# Patient Record
Sex: FEMALE | Race: WHITE | NOT HISPANIC OR LATINO
[De-identification: names, ages, dates, MRNs, and addresses within clinical notes are randomized per-mention and may not be internally consistent; named-entity substitution may affect disease eponyms.]

---

## 2020-07-20 ENCOUNTER — APPOINTMENT (OUTPATIENT)
Dept: RHEUMATOLOGY | Facility: CLINIC | Age: 60
End: 2020-07-20
Payer: COMMERCIAL

## 2020-07-20 VITALS
DIASTOLIC BLOOD PRESSURE: 83 MMHG | OXYGEN SATURATION: 96 % | SYSTOLIC BLOOD PRESSURE: 152 MMHG | HEIGHT: 63 IN | BODY MASS INDEX: 40.75 KG/M2 | TEMPERATURE: 98.2 F | WEIGHT: 230 LBS | HEART RATE: 92 BPM | RESPIRATION RATE: 16 BRPM

## 2020-07-20 DIAGNOSIS — Z86.39 PERSONAL HISTORY OF OTHER ENDOCRINE, NUTRITIONAL AND METABOLIC DISEASE: ICD-10-CM

## 2020-07-20 DIAGNOSIS — Z86.79 PERSONAL HISTORY OF OTHER DISEASES OF THE CIRCULATORY SYSTEM: ICD-10-CM

## 2020-07-20 PROCEDURE — 99205 OFFICE O/P NEW HI 60 MIN: CPT | Mod: GC

## 2020-07-20 NOTE — CONSULT LETTER
[Please see my note below.] : Please see my note below. [Dear  ___] : Dear  [unfilled], [Consult Letter:] : I had the pleasure of evaluating your patient, [unfilled]. [Sincerely,] : Sincerely, [Consult Closing:] : Thank you very much for allowing me to participate in the care of this patient.  If you have any questions, please do not hesitate to contact me. [FreeTextEntry3] : Oracio Schwartz MD\par Rheumatology\par [facility]\par  of Medicine\par [facility] \par \par [address]\[place] [address]\par \par [address]\[place] [address]\par \par [address] [address]\par \par phone:  [phone]\par fax:      [phone]\par

## 2020-07-20 NOTE — ASSESSMENT
[FreeTextEntry1] : 60yo F with DM, HTN, OA of the hips and knees presented to the clinic for evaluation of knee pain. on exam with pain with active ROM and mild ttp over the anterior knee b/l.\par \par \par Impression: \par Osteoarthritis of the knees and hip\par Carpal tunnel syndrome\par \par \par Recommendations\par -Naproxen BID prn\par -Voltaren gel  TID prn\par -referral to physical therapy\par -Knee braces \par -weight loss counseling\par -follow up in 3 weeks of intraarticular injection (synvisc)\par -patient will follow up with neurology numbness and decreased sensation over the hands

## 2020-07-20 NOTE — PHYSICAL EXAM
[General Appearance - Alert] : alert [General Appearance - In No Acute Distress] : in no acute distress [Auscultation Breath Sounds / Voice Sounds] : lungs were clear to auscultation bilaterally [Heart Rate And Rhythm] : heart rate was normal and rhythm regular [Heart Sounds] : normal S1 and S2 [Oriented To Time, Place, And Person] : oriented to person, place, and time [Sclera] : the sclera and conjunctiva were normal [Outer Ear] : the ears and nose were normal in appearance [Neck Appearance] : the appearance of the neck was normal [Oropharynx] : the oropharynx was normal [Neck Cervical Mass (___cm)] : no neck mass was observed [Jugular Venous Distention Increased] : there was no jugular-venous distention [Thyroid Diffuse Enlargement] : the thyroid was not enlarged [Thyroid Nodule] : there were no palpable thyroid nodules [Heart Sounds Gallop] : no gallops [Heart Sounds Pericardial Friction Rub] : no pericardial rub [Murmurs] : no murmurs [Abdomen Soft] : soft [Bowel Sounds] : normal bowel sounds [Abdomen Tenderness] : non-tender [] : no hepato-splenomegaly [Cervical Lymph Nodes Enlarged Posterior Bilaterally] : posterior cervical [Abdomen Mass (___ Cm)] : no abdominal mass palpated [Cervical Lymph Nodes Enlarged Anterior Bilaterally] : anterior cervical [Supraclavicular Lymph Nodes Enlarged Bilaterally] : supraclavicular [No CVA Tenderness] : no ~M costovertebral angle tenderness [No Spinal Tenderness] : no spinal tenderness [Skin Color & Pigmentation] : normal skin color and pigmentation [No Focal Deficits] : no focal deficits [Skin Turgor] : normal skin turgor [Impaired Insight] : insight and judgment were intact [Affect] : the affect was normal [FreeTextEntry1] : knee symmetric, with +1 lower ext edema. pain with active ROM on flexion of b/l knee. mild tenderness to palpation over the joint line. visible varices present on lower ext. passive ROM with minimal discomfort and full ROM;  B/L hips w/ pain upon movement in all planes

## 2020-07-20 NOTE — HISTORY OF PRESENT ILLNESS
[Arthralgias] : arthralgias [Decreased ROM] : decreased range of motion [Difficulty Standing] : difficulty standing [Difficulty Walking] : difficulty walking [FreeTextEntry1] : 60yo F with PMHx history of  DM, HTN,  knee OA and hip OA presented to the clinic for evaluation of knee pain.\par \par She describes the pain as constant, on bilateral knee, severe-moderate intensity, sharp and not radiated. walking and standing precipitates the pain and reports bilateral leg swelling and occasional knee swelling. she was following with Ortho and their last recommendation was surgery. she had problems with her insurance and couldn't follow up with them. she was referred to rheumatology by PCP for evaluation of joint complains.\par \par \par ROS:\par hand numbness and decreased sensation. use a split overnight which improved symptoms\par difficulty walk and standing due to knee pain\par has had multiple knee and hip injection in the pain with mild relief of symptoms [Anorexia] : no anorexia [Malaise] : no malaise [Weight Loss] : no weight loss [Fever] : no fever [Fatigue] : no fatigue [Chills] : no chills [Skin Nodules] : no skin nodules [Malar Facial Rash] : no malar facial rash [Skin Lesions] : no lesions [Cough] : no cough [Dry Mouth] : no dry mouth [Oral Ulcers] : no oral ulcers [Dysphagia] : no dysphagia [Dysphonia] : no dysphonia [Shortness of Breath] : no shortness of breath [Joint Swelling] : no joint swelling [Joint Warmth] : no joint warmth [Chest Pain] : no chest pain [Falls] : no falls [Morning Stiffness] : no morning stiffness [Joint Deformity] : no joint deformity [Dyspnea] : no dyspnea [Myalgias] : no myalgias [Muscle Weakness] : no muscle weakness [Muscle Spasms] : no muscle spasms [Visual Changes] : no visual changes [Eye Pain] : no eye pain [Muscle Cramping] : no muscle cramping [Dry Eyes] : no dry eyes [Eye Redness] : no eye redness

## 2020-07-20 NOTE — END OF VISIT
[] : Fellow [FreeTextEntry3] : I performed a history and physical exam of the patient and discussed his management with the fellow. I reviewed the fellow’s note and agree with the documented findings and plan of care.  58 y/o F with OA of the B/L hips and knees.  Will refer for PT, continue NSAID's, warm compresses, topical analgesics, weight loss, knee braces.  Pt also w/ numbness of her hands - has some features of CTS, though involvement of 5th fingers and decreased sensation suggestive of other neurologic etiology.  Refer to neurology.

## 2020-08-10 ENCOUNTER — APPOINTMENT (OUTPATIENT)
Dept: RHEUMATOLOGY | Facility: CLINIC | Age: 60
End: 2020-08-10
Payer: COMMERCIAL

## 2020-08-10 VITALS
DIASTOLIC BLOOD PRESSURE: 75 MMHG | TEMPERATURE: 98 F | HEART RATE: 101 BPM | OXYGEN SATURATION: 95 % | WEIGHT: 230 LBS | HEIGHT: 63 IN | SYSTOLIC BLOOD PRESSURE: 122 MMHG | BODY MASS INDEX: 40.75 KG/M2

## 2020-08-10 DIAGNOSIS — Z56.0 UNEMPLOYMENT, UNSPECIFIED: ICD-10-CM

## 2020-08-10 DIAGNOSIS — Z82.69 FAMILY HISTORY OF OTHER DISEASES OF THE MUSCULOSKELETAL SYSTEM AND CONNECTIVE TISSUE: ICD-10-CM

## 2020-08-10 DIAGNOSIS — Z78.9 OTHER SPECIFIED HEALTH STATUS: ICD-10-CM

## 2020-08-10 DIAGNOSIS — Z87.891 PERSONAL HISTORY OF NICOTINE DEPENDENCE: ICD-10-CM

## 2020-08-10 PROCEDURE — 99214 OFFICE O/P EST MOD 30 MIN: CPT

## 2020-08-10 SDOH — ECONOMIC STABILITY - INCOME SECURITY: UNEMPLOYMENT, UNSPECIFIED: Z56.0

## 2020-08-10 NOTE — HISTORY OF PRESENT ILLNESS
[Arthralgias] : arthralgias [Decreased ROM] : decreased range of motion [Difficulty Walking] : difficulty walking [Difficulty Standing] : difficulty standing [FreeTextEntry1] : Feeling "the same" since last visit.  Still w/ pain in her B/L knees and hips, unchanged.  She has started going for PT and says her knees now feel "a little looser", though no change in the pain level.  No new complaints. [Malaise] : no malaise [Weight Loss] : no weight loss [Anorexia] : no anorexia [Fever] : no fever [Fatigue] : no fatigue [Chills] : no chills [Skin Lesions] : no lesions [Malar Facial Rash] : no malar facial rash [Skin Nodules] : no skin nodules [Oral Ulcers] : no oral ulcers [Dysphonia] : no dysphonia [Dry Mouth] : no dry mouth [Cough] : no cough [Shortness of Breath] : no shortness of breath [Dysphagia] : no dysphagia [Joint Warmth] : no joint warmth [Chest Pain] : no chest pain [Joint Swelling] : no joint swelling [Morning Stiffness] : no morning stiffness [Falls] : no falls [Joint Deformity] : no joint deformity [Dyspnea] : no dyspnea [Myalgias] : no myalgias [Muscle Weakness] : no muscle weakness [Muscle Spasms] : no muscle spasms [Muscle Cramping] : no muscle cramping [Visual Changes] : no visual changes [Eye Pain] : no eye pain [Dry Eyes] : no dry eyes [Eye Redness] : no eye redness

## 2020-08-10 NOTE — PHYSICAL EXAM
[General Appearance - Alert] : alert [Sclera] : the sclera and conjunctiva were normal [General Appearance - In No Acute Distress] : in no acute distress [Outer Ear] : the ears and nose were normal in appearance [Neck Appearance] : the appearance of the neck was normal [Oropharynx] : the oropharynx was normal [Jugular Venous Distention Increased] : there was no jugular-venous distention [Neck Cervical Mass (___cm)] : no neck mass was observed [Thyroid Diffuse Enlargement] : the thyroid was not enlarged [Thyroid Nodule] : there were no palpable thyroid nodules [Heart Rate And Rhythm] : heart rate was normal and rhythm regular [Heart Sounds] : normal S1 and S2 [Auscultation Breath Sounds / Voice Sounds] : lungs were clear to auscultation bilaterally [Heart Sounds Gallop] : no gallops [Heart Sounds Pericardial Friction Rub] : no pericardial rub [Murmurs] : no murmurs [Abdomen Soft] : soft [Abdomen Tenderness] : non-tender [Bowel Sounds] : normal bowel sounds [Abdomen Mass (___ Cm)] : no abdominal mass palpated [Supraclavicular Lymph Nodes Enlarged Bilaterally] : supraclavicular [Cervical Lymph Nodes Enlarged Posterior Bilaterally] : posterior cervical [Cervical Lymph Nodes Enlarged Anterior Bilaterally] : anterior cervical [No CVA Tenderness] : no ~M costovertebral angle tenderness [No Spinal Tenderness] : no spinal tenderness [Skin Turgor] : normal skin turgor [Skin Color & Pigmentation] : normal skin color and pigmentation [Oriented To Time, Place, And Person] : oriented to person, place, and time [No Focal Deficits] : no focal deficits [] : no rash [Affect] : the affect was normal [Impaired Insight] : insight and judgment were intact [FreeTextEntry1] : lower ext edema +1

## 2020-08-10 NOTE — ASSESSMENT
[FreeTextEntry1] : 60yo F with:\par 1) OA of the hips and knees:\par   - Reiterated importance of exercise\par   - Cont nabumetone 750mg BID prn +/- Tylenol prn\par   - wt loss\par   - warm compresses\par   - OTC topical analgesics\par   - knee braces\par   - Will plan to perform Durolane injections to B/L knees\par   - Awaiting results of prior bloodwork and x-rays.\par 2)  Numbness in B/L hands:  pt w/ some features of CTS, though involvement of 5th fingers and decreased sensation suggestive of other neurologic etiology\par   - Awaiting neurology eval.

## 2020-09-14 ENCOUNTER — RX RENEWAL (OUTPATIENT)
Age: 60
End: 2020-09-14

## 2020-09-14 ENCOUNTER — APPOINTMENT (OUTPATIENT)
Dept: RHEUMATOLOGY | Facility: CLINIC | Age: 60
End: 2020-09-14
Payer: COMMERCIAL

## 2020-09-14 VITALS
RESPIRATION RATE: 16 BRPM | OXYGEN SATURATION: 94 % | TEMPERATURE: 98 F | SYSTOLIC BLOOD PRESSURE: 128 MMHG | HEIGHT: 63 IN | BODY MASS INDEX: 40.75 KG/M2 | WEIGHT: 230 LBS | DIASTOLIC BLOOD PRESSURE: 78 MMHG | HEART RATE: 92 BPM

## 2020-09-14 PROCEDURE — 99214 OFFICE O/P EST MOD 30 MIN: CPT | Mod: 25,GC

## 2020-09-14 PROCEDURE — 20610 DRAIN/INJ JOINT/BURSA W/O US: CPT | Mod: 50,GC

## 2020-09-14 RX ORDER — HYALURONATE SODIUM, STABILIZED 60 MG/3 ML
60 SYRINGE (ML) INTRAARTICULAR
Refills: 0 | Status: COMPLETED
Start: 2020-09-14

## 2020-09-14 RX ORDER — HYALURONATE SODIUM, STABILIZED 60 MG/3 ML
60 SYRINGE (ML) INTRAARTICULAR
Refills: 0 | Status: COMPLETED | OUTPATIENT
Start: 2020-09-14

## 2020-09-14 RX ADMIN — Medication 0 MG/3ML: at 00:00

## 2020-09-14 NOTE — END OF VISIT
[] : Fellow [Fellow] : Fellow [FreeTextEntry3] : I performed a history and physical exam of the patient and discussed his management with the fellow. I reviewed the fellow’s note and agree with the documented findings and plan of care.  59 year old female with OA of B/L knees.  Fellow performed Durolane injections to B/L knees under my supervision. [FreeTextEntry2] : see above

## 2020-09-14 NOTE — PROCEDURE
[Arthrocentesis] : arthrocentesis was performed [Today's Date:] : Date: [unfilled] [Risks] : risks [Benefits] : benefits [Alternatives] : alternatives [Consent Obtained] : written consent was obtained prior to the procedure and is detailed in the patient's record [Patient] : Prior to the start of the procedure a time out was taken and the identity of the patient was confirmed via name and date of birth with the patient. The correct site and the procedure to be performed were confirmed. The correct side was confirmed if applicable. The availability of the correct equipment was verified [#1 Site: ______] : #1 site identified in the [unfilled] [Therapeutic] : therapeutic [___ml Visccosupplementation] : [unfilled] ml of viscosupplementation [#2 Site: ___] : # 2 site identified in the [unfilled] [Ethyl Chloride] : ethyl chloride [___ ml Inj] : [unfilled] ~Uml [1%] : 1% [Without Epi] : without epinephrine [Betadine] : with betadine solution [22 gauge 1.5 inch] : A 22 gauge 1.5 inch needle was used [Tolerated Well] : the patient tolerated the procedure well [Reports Improvement in Symptoms] : reports improvement in symptoms [No Complications] : there were no complications [Instructions Given] : handouts/patient instructions were given to patient [Patient Instructed to Call] : patient was instructed to call if redness at site, a decrease in range of motion or an increase in pain is noted after procedure. [de-identified] : Durolane 60mg [FreeTextEntry1] : Bilateral knee hyaluronic acid injections. Lot [id number] - 2027 11 30

## 2020-09-14 NOTE — HISTORY OF PRESENT ILLNESS
[___ Month(s) Ago] : [unfilled] month(s) ago [Arthralgias] : arthralgias [Decreased ROM] : decreased range of motion [Difficulty Standing] : difficulty standing [Difficulty Walking] : difficulty walking [FreeTextEntry1] : continues to have constant pain in the knees, with transient periods of relief when not performing physical activities. today in the clinic for hyaluronic acid injections on b/l knees. [Anorexia] : no anorexia [Malaise] : no malaise [Weight Loss] : no weight loss [Fever] : no fever [Fatigue] : no fatigue [Chills] : no chills [Malar Facial Rash] : no malar facial rash [Skin Lesions] : no lesions [Oral Ulcers] : no oral ulcers [Cough] : no cough [Skin Nodules] : no skin nodules [Dysphonia] : no dysphonia [Dry Mouth] : no dry mouth [Dysphagia] : no dysphagia [Joint Swelling] : no joint swelling [Chest Pain] : no chest pain [Shortness of Breath] : no shortness of breath [Joint Warmth] : no joint warmth [Morning Stiffness] : no morning stiffness [Joint Deformity] : no joint deformity [Dyspnea] : no dyspnea [Falls] : no falls [Myalgias] : no myalgias [Muscle Weakness] : no muscle weakness [Muscle Spasms] : no muscle spasms [Muscle Cramping] : no muscle cramping [Visual Changes] : no visual changes [Eye Pain] : no eye pain [Dry Eyes] : no dry eyes [Eye Redness] : no eye redness

## 2020-09-14 NOTE — ASSESSMENT
[FreeTextEntry1] : 60yo F with:\par \par 1) OA of the hips and knees: Bilateral knee Hyaluronic acid injections\par   - Reiterated importance of exercise\par   - Cont nabumetone 750mg BID prn +/- Tylenol prn\par   - wt loss\par   - warm compresses\par   - OTC topical analgesics\par   - knee braces\par   - Durolane injections to B/L knees today 9/14/2020\par   \par 2)  Numbness in B/L hands:  pt w/ some features of CTS, though involvement of 5th fingers and decreased sensation suggestive of other neurologic etiology\par   - Awaiting neurology eval.\par \par \par d/w Dr. Schwartz

## 2020-09-14 NOTE — REVIEW OF SYSTEMS
[Arthralgias] : arthralgias [Joint Pain] : joint pain [Joint Stiffness] : joint stiffness [Joint Swelling] : joint swelling [As Noted in HPI] : as noted in HPI

## 2020-09-14 NOTE — PHYSICAL EXAM
[General Appearance - In No Acute Distress] : in no acute distress [General Appearance - Alert] : alert [Sclera] : the sclera and conjunctiva were normal [Outer Ear] : the ears and nose were normal in appearance [Oropharynx] : the oropharynx was normal [Neck Appearance] : the appearance of the neck was normal [Neck Cervical Mass (___cm)] : no neck mass was observed [Thyroid Nodule] : there were no palpable thyroid nodules [Thyroid Diffuse Enlargement] : the thyroid was not enlarged [Jugular Venous Distention Increased] : there was no jugular-venous distention [Auscultation Breath Sounds / Voice Sounds] : lungs were clear to auscultation bilaterally [Heart Sounds Gallop] : no gallops [Heart Rate And Rhythm] : heart rate was normal and rhythm regular [Heart Sounds] : normal S1 and S2 [Heart Sounds Pericardial Friction Rub] : no pericardial rub [Murmurs] : no murmurs [Bowel Sounds] : normal bowel sounds [Abdomen Soft] : soft [Abdomen Tenderness] : non-tender [Abdomen Mass (___ Cm)] : no abdominal mass palpated [Cervical Lymph Nodes Enlarged Anterior Bilaterally] : anterior cervical [Cervical Lymph Nodes Enlarged Posterior Bilaterally] : posterior cervical [No CVA Tenderness] : no ~M costovertebral angle tenderness [Supraclavicular Lymph Nodes Enlarged Bilaterally] : supraclavicular [No Spinal Tenderness] : no spinal tenderness [Skin Color & Pigmentation] : normal skin color and pigmentation [Skin Turgor] : normal skin turgor [No Focal Deficits] : no focal deficits [] : no rash [Impaired Insight] : insight and judgment were intact [Affect] : the affect was normal [Oriented To Time, Place, And Person] : oriented to person, place, and time [FreeTextEntry1] : knee symmetric, with +2 lower ext edema. pain with active ROM on flexion of b/l knee. visible varices present on lower ext. passive ROM with minimal discomfort and full ROM;  B/L hips w/ pain and decreased ROM in all planes

## 2020-11-16 ENCOUNTER — RX RENEWAL (OUTPATIENT)
Age: 60
End: 2020-11-16

## 2020-12-14 ENCOUNTER — APPOINTMENT (OUTPATIENT)
Dept: RHEUMATOLOGY | Facility: CLINIC | Age: 60
End: 2020-12-14
Payer: COMMERCIAL

## 2020-12-14 VITALS
SYSTOLIC BLOOD PRESSURE: 148 MMHG | HEIGHT: 63 IN | WEIGHT: 222 LBS | HEART RATE: 78 BPM | OXYGEN SATURATION: 97 % | TEMPERATURE: 97.6 F | BODY MASS INDEX: 39.34 KG/M2 | DIASTOLIC BLOOD PRESSURE: 74 MMHG

## 2020-12-14 PROCEDURE — 99214 OFFICE O/P EST MOD 30 MIN: CPT

## 2020-12-14 PROCEDURE — 99072 ADDL SUPL MATRL&STAF TM PHE: CPT

## 2020-12-14 NOTE — ASSESSMENT
[FreeTextEntry1] : 60yo F with:\par \par 1) OA of the hips and knees:  No improvement s/p Durolane injections at last visit.\par   - Reiterated importance of exercise\par   - Cont nabumetone 750mg BID prn +/- Tylenol prn\par   - wt loss\par   - warm compresses\par   - OTC topical analgesics\par   - knee braces\par   - Will plan to repeat viscosupplementation at next visit.\par   - Recommended that pt f/u w/ ortho to discuss potential TKR.\par   \par 2)  Numbness in B/L hands:  pt w/ some features of CTS, though involvement of 5th fingers and decreased sensation suggestive of other neurologic etiology.  Currently improved /w wrist splints.\par   - Cont wrist splints qhs.\par   - Awaiting neurology eval.\par

## 2020-12-14 NOTE — HISTORY OF PRESENT ILLNESS
[___ Month(s) Ago] : [unfilled] month(s) ago [Arthralgias] : arthralgias [Decreased ROM] : decreased range of motion [Difficulty Standing] : difficulty standing [Difficulty Walking] : difficulty walking [FreeTextEntry1] : Feeing "the same" since last visit.  Still w/ severe pain in her B/L knees and hips with walking - she says she requires a walker due to the pain.  No relief from Durolane.  Numbness in hands fluctuates, but has improved overall with the wrist braces at nights.   No new complaints. [Anorexia] : no anorexia [Weight Loss] : no weight loss [Malaise] : no malaise [Fever] : no fever [Chills] : no chills [Fatigue] : no fatigue [Malar Facial Rash] : no malar facial rash [Skin Lesions] : no lesions [Skin Nodules] : no skin nodules [Oral Ulcers] : no oral ulcers [Cough] : no cough [Dry Mouth] : no dry mouth [Dysphonia] : no dysphonia [Dysphagia] : no dysphagia [Shortness of Breath] : no shortness of breath [Chest Pain] : no chest pain [Joint Swelling] : no joint swelling [Joint Warmth] : no joint warmth [Joint Deformity] : no joint deformity [Morning Stiffness] : no morning stiffness [Falls] : no falls [Dyspnea] : no dyspnea [Myalgias] : no myalgias [Muscle Weakness] : no muscle weakness [Muscle Spasms] : no muscle spasms [Muscle Cramping] : no muscle cramping [Visual Changes] : no visual changes [Eye Pain] : no eye pain [Eye Redness] : no eye redness [Dry Eyes] : no dry eyes

## 2020-12-14 NOTE — PHYSICAL EXAM
[General Appearance - Alert] : alert [General Appearance - In No Acute Distress] : in no acute distress [Sclera] : the sclera and conjunctiva were normal [Outer Ear] : the ears and nose were normal in appearance [Oropharynx] : the oropharynx was normal [Neck Appearance] : the appearance of the neck was normal [Neck Cervical Mass (___cm)] : no neck mass was observed [Jugular Venous Distention Increased] : there was no jugular-venous distention [Thyroid Diffuse Enlargement] : the thyroid was not enlarged [Thyroid Nodule] : there were no palpable thyroid nodules [Auscultation Breath Sounds / Voice Sounds] : lungs were clear to auscultation bilaterally [Heart Rate And Rhythm] : heart rate was normal and rhythm regular [Heart Sounds] : normal S1 and S2 [Heart Sounds Gallop] : no gallops [Murmurs] : no murmurs [Heart Sounds Pericardial Friction Rub] : no pericardial rub [Bowel Sounds] : normal bowel sounds [Abdomen Soft] : soft [Abdomen Tenderness] : non-tender [Abdomen Mass (___ Cm)] : no abdominal mass palpated [Cervical Lymph Nodes Enlarged Posterior Bilaterally] : posterior cervical [Cervical Lymph Nodes Enlarged Anterior Bilaterally] : anterior cervical [Supraclavicular Lymph Nodes Enlarged Bilaterally] : supraclavicular [No CVA Tenderness] : no ~M costovertebral angle tenderness [No Spinal Tenderness] : no spinal tenderness [Skin Color & Pigmentation] : normal skin color and pigmentation [Skin Turgor] : normal skin turgor [] : no rash [No Focal Deficits] : no focal deficits [Oriented To Time, Place, And Person] : oriented to person, place, and time [Impaired Insight] : insight and judgment were intact [Affect] : the affect was normal [FreeTextEntry1] : No synovitis;  B/L knees w/ pain upon flexion, (+)crepitus;  B/L hips w/ pain and decreased ROM in all planes

## 2020-12-14 NOTE — REVIEW OF SYSTEMS
[Arthralgias] : arthralgias [Joint Pain] : joint pain [Joint Swelling] : joint swelling [Joint Stiffness] : joint stiffness [As Noted in HPI] : as noted in HPI

## 2021-01-11 ENCOUNTER — RX RENEWAL (OUTPATIENT)
Age: 61
End: 2021-01-11

## 2021-03-02 RX ORDER — HYALURONATE SODIUM, STABILIZED 60 MG/3 ML
60 SYRINGE (ML) INTRAARTICULAR
Qty: 2 | Refills: 0 | Status: ACTIVE | COMMUNITY
Start: 2020-08-13

## 2021-03-15 ENCOUNTER — APPOINTMENT (OUTPATIENT)
Dept: RHEUMATOLOGY | Facility: CLINIC | Age: 61
End: 2021-03-15
Payer: COMMERCIAL

## 2021-03-15 VITALS
WEIGHT: 223 LBS | SYSTOLIC BLOOD PRESSURE: 135 MMHG | RESPIRATION RATE: 16 BRPM | HEIGHT: 63 IN | HEART RATE: 85 BPM | BODY MASS INDEX: 39.51 KG/M2 | TEMPERATURE: 97.8 F | OXYGEN SATURATION: 100 % | DIASTOLIC BLOOD PRESSURE: 82 MMHG

## 2021-03-15 DIAGNOSIS — R20.0 ANESTHESIA OF SKIN: ICD-10-CM

## 2021-03-15 DIAGNOSIS — G56.03 CARPAL TUNNEL SYNDROM,BILATERAL UPPER LIMBS: ICD-10-CM

## 2021-03-15 DIAGNOSIS — M25.562 PAIN IN RIGHT KNEE: ICD-10-CM

## 2021-03-15 DIAGNOSIS — M89.49 OTHER HYPERTROPHIC OSTEOARTHROPATHY, MULTIPLE SITES: ICD-10-CM

## 2021-03-15 DIAGNOSIS — M13.0 POLYARTHRITIS, UNSPECIFIED: ICD-10-CM

## 2021-03-15 DIAGNOSIS — M25.561 PAIN IN RIGHT KNEE: ICD-10-CM

## 2021-03-15 DIAGNOSIS — G89.29 PAIN IN RIGHT KNEE: ICD-10-CM

## 2021-03-15 PROCEDURE — 99072 ADDL SUPL MATRL&STAF TM PHE: CPT

## 2021-03-15 PROCEDURE — 99214 OFFICE O/P EST MOD 30 MIN: CPT | Mod: GC

## 2021-03-15 NOTE — HISTORY OF PRESENT ILLNESS
[___ Month(s) Ago] : [unfilled] month(s) ago [Arthralgias] : arthralgias [Decreased ROM] : decreased range of motion [Difficulty Standing] : difficulty standing [Difficulty Walking] : difficulty walking [FreeTextEntry1] : -today the patient reported continuous knee, back and hip complains similar to past office visit. bilateral knee viscous supplementation didn’t provide significant changes to her symptoms. \par -continues to complain of hand numbness from her carpal tunnel but improved with the use of night splints\par  [Anorexia] : no anorexia [Weight Loss] : no weight loss [Malaise] : no malaise [Fever] : no fever [Chills] : no chills [Fatigue] : no fatigue [Malar Facial Rash] : no malar facial rash [Skin Lesions] : no lesions [Skin Nodules] : no skin nodules [Oral Ulcers] : no oral ulcers [Cough] : no cough [Dry Mouth] : no dry mouth [Dysphonia] : no dysphonia [Dysphagia] : no dysphagia [Shortness of Breath] : no shortness of breath [Chest Pain] : no chest pain [Joint Swelling] : no joint swelling [Joint Warmth] : no joint warmth [Joint Deformity] : no joint deformity [Morning Stiffness] : no morning stiffness [Falls] : no falls [Dyspnea] : no dyspnea [Myalgias] : no myalgias [Muscle Weakness] : no muscle weakness [Muscle Spasms] : no muscle spasms [Muscle Cramping] : no muscle cramping [Visual Changes] : no visual changes [Eye Pain] : no eye pain [Eye Redness] : no eye redness [Dry Eyes] : no dry eyes

## 2021-03-15 NOTE — ASSESSMENT
[FreeTextEntry1] : 58yo F with:\par \par 1) OA of the hips and knees:  No improvement s/p Durolane injections: no improvement of clinical symptoms despite extensive conservative/medical treatment plan. Will refer the patient to ortho for evaluation of knee replacement. \par   - ortho referral today\par   - Reiterated importance of exercise\par   - Cont nabumetone 750mg BID prn +/- Tylenol prn\par   - wt loss\par   - warm compresses\par   - OTC topical analgesics\par   - knee braces\par  \par   \par 2)  Numbness in B/L hands:  pt w/ some features of CTS, though involvement of 5th fingers and decreased sensation suggestive of other neurologic etiology.  Currently improved /w wrist splints.\par   - Cont wrist splints qhs.\par  \par \par \par \par D/w Dr. Schwartz

## 2021-03-15 NOTE — END OF VISIT
[] : Fellow [FreeTextEntry3] : I performed a history and physical exam of the patient and discussed his management with the fellow. I reviewed the fellow’s note and agree with the documented findings and plan of care.  60 year old female with severe OA of B/L hips and knees.  Not responding to conservative treatment, including PT/exercise, NSAIDS, topicals, knee braces, and injections.  Therefore referring to ortho for possible TKR and/or THR.

## 2021-04-12 ENCOUNTER — APPOINTMENT (OUTPATIENT)
Dept: ORTHOPEDIC SURGERY | Facility: CLINIC | Age: 61
End: 2021-04-12
Payer: COMMERCIAL

## 2021-04-12 VITALS — HEIGHT: 63 IN | BODY MASS INDEX: 40.93 KG/M2 | WEIGHT: 231 LBS

## 2021-04-12 DIAGNOSIS — E78.00 PURE HYPERCHOLESTEROLEMIA, UNSPECIFIED: ICD-10-CM

## 2021-04-12 DIAGNOSIS — E11.9 TYPE 2 DIABETES MELLITUS W/OUT COMPLICATIONS: ICD-10-CM

## 2021-04-12 DIAGNOSIS — I10 ESSENTIAL (PRIMARY) HYPERTENSION: ICD-10-CM

## 2021-04-12 DIAGNOSIS — M17.0 BILATERAL PRIMARY OSTEOARTHRITIS OF KNEE: ICD-10-CM

## 2021-04-12 DIAGNOSIS — Z00.00 ENCOUNTER FOR GENERAL ADULT MEDICAL EXAMINATION W/OUT ABNORMAL FINDINGS: ICD-10-CM

## 2021-04-12 DIAGNOSIS — I89.0 LYMPHEDEMA, NOT ELSEWHERE CLASSIFIED: ICD-10-CM

## 2021-04-12 PROCEDURE — 73564 X-RAY EXAM KNEE 4 OR MORE: CPT | Mod: LT

## 2021-04-12 PROCEDURE — 99204 OFFICE O/P NEW MOD 45 MIN: CPT

## 2021-04-12 PROCEDURE — 99072 ADDL SUPL MATRL&STAF TM PHE: CPT

## 2021-04-12 RX ORDER — AZITHROMYCIN 250 MG/1
250 TABLET, FILM COATED ORAL
Qty: 6 | Refills: 0 | Status: ACTIVE | COMMUNITY
Start: 2021-01-07

## 2021-04-12 RX ORDER — SITAGLIPTIN AND METFORMIN HYDROCHLORIDE 50; 1000 MG/1; MG/1
50-1000 TABLET, FILM COATED ORAL
Qty: 180 | Refills: 0 | Status: ACTIVE | COMMUNITY
Start: 2021-02-16

## 2021-04-12 RX ORDER — CANDESARTAN CILEXETIL 32 MG/1
32 TABLET ORAL
Qty: 90 | Refills: 0 | Status: ACTIVE | COMMUNITY
Start: 2021-02-16

## 2021-04-12 RX ORDER — CARVEDILOL 25 MG/1
25 TABLET, FILM COATED ORAL
Qty: 180 | Refills: 0 | Status: ACTIVE | COMMUNITY
Start: 2021-03-23

## 2021-04-12 RX ORDER — ATORVASTATIN CALCIUM 40 MG/1
40 TABLET, FILM COATED ORAL
Qty: 90 | Refills: 0 | Status: ACTIVE | COMMUNITY
Start: 2020-09-08

## 2021-04-12 RX ORDER — HYDROCHLOROTHIAZIDE 25 MG/1
25 TABLET ORAL
Qty: 90 | Refills: 0 | Status: ACTIVE | COMMUNITY
Start: 2021-03-14

## 2021-04-12 NOTE — DISCUSSION/SUMMARY
[de-identified] : This patient has bilateral knee osteoarthritis. The patient is not an appropriate candidate for surgical intervention at this time. An extensive discussion was conducted on the natural history of the disease and the variety of surgical and non-surgical options available to the patient including, but not limited to non-steroidal anti-inflammatory medications, steroid injections, physical therapy, maintenance of ideal body weight, and reduction of activity. She will work on weight loss. I sent her to lymphedema therapy for management of her lymphedema. Continue with NSAIDs.\par The patient will schedule an appointment as needed.\par \par The patient has been counseled regarding the elevated risks associated with surgical complications in patients with a BMI>35.  I explained to the patient the risk of obesity, which is well documented in the orthopedic literature as increasing risks of wound breakdown (dehiscence), drainage, periprosthetic joint infection, dissatisfaction, chronic pain, early failure and/or loosening of the prosthesis, and impaired overall outcome to the patient. The patient demonstrates a profound understanding of the increased risk. Nutritionist referral, methods of monitoring nutrition intake and calorie counting and bariatric surgery options were discussed with the patient. After a lengthy discussion, the patient agreed to make a coordinated effort at weight loss. The patient understands our BMI policy and if they are planning surgical intervention, then they will need to bring their BMI below 40 in order to proceed and they are agreeable to this.

## 2021-04-12 NOTE — HISTORY OF PRESENT ILLNESS
[de-identified] : This is very nice 60-year-old female experiencing bilateral knee pain, which is severe in intensity.  More pain in the right knee than in the left knee.  The pain substantially limits activities of daily living. Walking tolerance is reduced. Medication and activity modification have been minimally effective for a period lasting greater than three months in duration. Assistive devices and external support were not deemed by the patient to be helpful in improving their function. Due to the severity of osteoarthritis and level of pain, physical therapy is contraindicated. Pain and restriction of function are intolerable at this time. The patient denies any radiation of the pain to the feet and it is not associated with numbness, tingling, or weakness. Been using a walker for the last 6 months. She has tried NSAIDs, cortisone and hyaluronic acid injections which have not helped.

## 2021-04-12 NOTE — PHYSICAL EXAM
[de-identified] : Patient is well nourished, well-developed, in no acute distress, with appropriate mood and affect. The patient is oriented to time, place, and person. Respirations are even and unlabored. Gait evaluation does reveal a limp. There is no inguinal adenopathy. Bilateral limbs are well-perfused, without skin lesions, shows a grossly normal motor and sensory examination. The right knee motion is significantly reduced and does cause significant pain. The right knee moves from 0-110 degrees. The knee is stable within that range-of-motion to AP and ML stress. The alignment of the knee is 5 degrees varus. Muscle strength is normal. Pedal pulses are palpable. Hip examination was negative. The left knee motion is significantly reduced and does cause significant pain. The left knee moves from 0-110 degrees. The knee is stable within that range-of-motion to AP and ML stress. The alignment of the knee is 5 degrees varus. Muscle strength is normal. Pedal pulses are palpable. Hip examination was negative. [de-identified] : Long standing knee, AP knee, lateral knee, and patellar views of the bilateral knee were ordered and taken in the office and demonstrate degenerative joint disease of the knee with joint space narrowing, osteophyte formation, and subchondral sclerosis.

## 2021-05-11 ENCOUNTER — RX RENEWAL (OUTPATIENT)
Age: 61
End: 2021-05-11

## 2021-06-14 ENCOUNTER — APPOINTMENT (OUTPATIENT)
Dept: RHEUMATOLOGY | Facility: CLINIC | Age: 61
End: 2021-06-14

## 2021-07-09 ENCOUNTER — RX RENEWAL (OUTPATIENT)
Age: 61
End: 2021-07-09

## 2021-09-14 ENCOUNTER — RX RENEWAL (OUTPATIENT)
Age: 61
End: 2021-09-14

## 2021-10-13 ENCOUNTER — RX RENEWAL (OUTPATIENT)
Age: 61
End: 2021-10-13

## 2021-11-01 ENCOUNTER — APPOINTMENT (OUTPATIENT)
Dept: RHEUMATOLOGY | Facility: CLINIC | Age: 61
End: 2021-11-01

## 2021-11-09 ENCOUNTER — RX RENEWAL (OUTPATIENT)
Age: 61
End: 2021-11-09

## 2021-11-09 RX ORDER — NABUMETONE 750 MG/1
750 TABLET, FILM COATED ORAL
Qty: 60 | Refills: 0 | Status: ACTIVE | COMMUNITY
Start: 2020-07-22 | End: 1900-01-01

## 2021-11-29 ENCOUNTER — APPOINTMENT (OUTPATIENT)
Dept: RHEUMATOLOGY | Facility: CLINIC | Age: 61
End: 2021-11-29

## 2021-12-09 ENCOUNTER — RX RENEWAL (OUTPATIENT)
Age: 61
End: 2021-12-09

## 2022-03-18 ENCOUNTER — RX RENEWAL (OUTPATIENT)
Age: 62
End: 2022-03-18

## 2022-05-16 ENCOUNTER — RX RENEWAL (OUTPATIENT)
Age: 62
End: 2022-05-16